# Patient Record
Sex: FEMALE | Race: ASIAN | NOT HISPANIC OR LATINO | Employment: UNEMPLOYED | URBAN - METROPOLITAN AREA
[De-identification: names, ages, dates, MRNs, and addresses within clinical notes are randomized per-mention and may not be internally consistent; named-entity substitution may affect disease eponyms.]

---

## 2023-11-21 ENCOUNTER — APPOINTMENT (OUTPATIENT)
Dept: OTOLARYNGOLOGY | Facility: CLINIC | Age: 18
End: 2023-11-21
Payer: COMMERCIAL

## 2023-12-12 ENCOUNTER — OFFICE VISIT (OUTPATIENT)
Dept: OTOLARYNGOLOGY | Facility: CLINIC | Age: 18
End: 2023-12-12
Payer: COMMERCIAL

## 2023-12-12 VITALS — HEIGHT: 61 IN | BODY MASS INDEX: 18.67 KG/M2 | WEIGHT: 98.9 LBS

## 2023-12-12 DIAGNOSIS — R04.0 EPISTAXIS: Primary | ICD-10-CM

## 2023-12-12 PROCEDURE — 31238 NSL/SINS NDSC SRG NSL HEMRRG: CPT | Performed by: OTOLARYNGOLOGY

## 2023-12-12 PROCEDURE — 99203 OFFICE O/P NEW LOW 30 MIN: CPT | Performed by: OTOLARYNGOLOGY

## 2023-12-12 PROCEDURE — 1036F TOBACCO NON-USER: CPT | Performed by: OTOLARYNGOLOGY

## 2023-12-12 RX ORDER — FLUOXETINE HYDROCHLORIDE 60 MG/1
60 TABLET, FILM COATED ORAL; ORAL DAILY
COMMUNITY
End: 2024-02-20 | Stop reason: SDUPTHER

## 2023-12-12 RX ORDER — BUPROPION HYDROCHLORIDE 300 MG/1
300 TABLET ORAL DAILY
COMMUNITY
End: 2024-02-20 | Stop reason: SDUPTHER

## 2023-12-12 ASSESSMENT — PATIENT HEALTH QUESTIONNAIRE - PHQ9
SUM OF ALL RESPONSES TO PHQ9 QUESTIONS 1 & 2: 0
2. FEELING DOWN, DEPRESSED OR HOPELESS: NOT AT ALL
1. LITTLE INTEREST OR PLEASURE IN DOING THINGS: NOT AT ALL

## 2023-12-12 ASSESSMENT — PAIN SCALES - GENERAL: PAINLEVEL: 0-NO PAIN

## 2023-12-14 NOTE — PROGRESS NOTES
"Chief Complaint:  Epistaxis    Referring Provider: No ref. provider found    History of Present Illness:    Kera Pelaez is a 18 y.o. female, presenting for evaluation of epistaxis. She has been having recurrent epistaxis episodes from both sides of the nose or many years. Lately she has been having more episodes and has actually started keeping track. There are no obvious inciting factors. She has never been treated for epistaxis in the past. The side of her bleeding varies. She has not used saline gel or another emollient. No history of easy bruising or heavy periods. No other history of sinonasal issues.     Review of Systems:    Review of symptoms was negative except for those stated including Cardiopulmonary, Genitourinary, Gastrointestinal, Psychological, Sleep pattern, Endocrine, Eyes, Neurologic, Musculoskeletal, Skin, Hematologic/Lymphatic and Allergic/Immunologic.     Medical History:    I have reviewed the patient's updated past medical history, surgical history, family history, social history, as well as current medications and allergies as of 12/12/2023. Changes to these items have been updated and marked as reviewed in the electronic medical record.    Physical Exam:    Vitals:  height is 1.549 m (5' 1\") and weight is 44.9 kg (98 lb 14.4 oz) (abnormal).   General: Patient doing well overall and is in no apparent distress.  Psych: Pleasant affect, and answers questions appropriately.  Head & Face: Symmetric facial movements  Eyes: Pupils equal, round, reactive.  Extraocular movements intact without gaze restrictions or nystagmus. No epiphora.  Ears:  External auditory canals are normal.  Tympanic membranes are clear.  No middle ear effusion is seen.  All middle ear landmarks are normal.  Nose: Anterior rhinoscopy revealed normal sinonasal mucosa. More posterior areas of the nasal cavity could not be completely examined.  Oral Cavity/Oropharynx:  Without lesions or masses to visual exam.  Neck: Supple " without lymphadenopathy.  Lungs: Non-labored, and without evidence of stridor.  Cardiac: Pulses are strong, well-perfused.  Extremities: Without gross evidence of clubbing, cyanosis, or edema.  Neuro: Cranial nerves II-XII grossly intact; Intact facial movements.    Procedure:  Endoscopic control of nasal hemorrhage  Pre-procedure diagnosis/Indication for procedure:  epistaxis  Anesthesia:  1% phenylephrine and 4% lidocaine topical spray   Description:  After application of anesthesia, a zero degree telescope was used to visualize the bilateral sinonasal cavity. There were numerous exposed superficial blood vessels along the anterior septum bilaterally. Silver nitrate was applied to the right sided vessels with good coagulation effect. No bleeding was appreciated and the patient tolerated the procedure well.     Assessment:     Kera Pelaez is a 18 y.o. female with epistaxis from bilateral anterior septal superficial blood vessels. Now s/p silver nitrate cautery of right sides vessels in the office today.    Plan:      Tolerated cautery well today. Advised to keep area moist and hydrated. Follow up in 2 weeks to re-assess. Counseled on use of Afrin in the event of bleeding. Will plan to cauterize remaining left sided vessels in January after the right side heals.      Ethan Lennon MD, M.Eng.   of Otolaryngology - Head & Neck Surgery  Division of Rhinology and Endoscopic Skull Base Surgery  [facility]

## 2023-12-26 ENCOUNTER — OFFICE VISIT (OUTPATIENT)
Dept: OTOLARYNGOLOGY | Facility: CLINIC | Age: 18
End: 2023-12-26
Payer: COMMERCIAL

## 2023-12-26 VITALS — WEIGHT: 99.2 LBS | HEIGHT: 61 IN | BODY MASS INDEX: 18.73 KG/M2

## 2023-12-26 DIAGNOSIS — R04.0 EPISTAXIS: Primary | ICD-10-CM

## 2023-12-26 PROCEDURE — 1036F TOBACCO NON-USER: CPT | Performed by: OTOLARYNGOLOGY

## 2023-12-26 PROCEDURE — 99213 OFFICE O/P EST LOW 20 MIN: CPT | Performed by: OTOLARYNGOLOGY

## 2023-12-26 ASSESSMENT — PAIN SCALES - GENERAL: PAINLEVEL: 0-NO PAIN

## 2023-12-26 NOTE — PROGRESS NOTES
"Chief Complaint:  Epistaxis follow up    Referring Provider: No ref. provider found    History of Present Illness:    Kera Pelaez is a 18 y.o. female, presenting for follow-up of epistaxis.  She states that since her cauterization on her last visit she has not had any significant bleeding.  She occasionally got some spotting on the right side at the source of cauterization but overall has not had any issues.  No new complaints.    ?  Review of Systems:    Review of symptoms was negative except for those stated including Cardiopulmonary, Genitourinary, Gastrointestinal, Psychological, Sleep pattern, Endocrine, Eyes, Neurologic, Musculoskeletal, Skin, Hematologic/Lymphatic and Allergic/Immunologic.     Medical History:    I have reviewed the patient's updated past medical history, surgical history, family history, social history, as well as current medications and allergies as of 12/26/2023. Changes to these items have been updated and marked as reviewed in the electronic medical record.    Physical Exam:    Vitals:  height is 1.549 m (5' 1\") and weight is 45 kg (99 lb 3.2 oz) (abnormal).   General: Patient doing well overall and is in no apparent distress.  Psych: Pleasant affect, and answers questions appropriately.  Head & Face: Symmetric facial movements  Eyes: Pupils equal, round, reactive.  Extraocular movements intact without gaze restrictions or nystagmus. No epiphora.  Ears:  External auditory canals are normal.  Tympanic membranes are clear.  No middle ear effusion is seen.  All middle ear landmarks are normal.  Nose: Anterior rhinoscopy revealed healing mucosa on right septum-- thin crusting observed and in place. Left septal vessels stable in appearance. More posterior areas of the nasal cavity could not be completely examined.  Oral Cavity/Oropharynx:  Without lesions or masses to visual exam.  Neck: Supple without lymphadenopathy.  Lungs: Non-labored, and without evidence of stridor.  Cardiac: Pulses are " strong, well-perfused.  Extremities: Without gross evidence of clubbing, cyanosis, or edema.  Neuro: Cranial nerves II-XII grossly intact; Intact facial movements.    Assessment:     Kera Pelaez is a 18 y.o. female with epistaxis from anterior septal vessels.    Plan:      I am quite happy with Kera's healing and think her right septum is coming along nicely.  I am uncertain if her left septum will be ready to cauterize in early January as we need the right sided mucosa to be fully healed to reduce the chances of a septal perforation from forming.  I advised her that we can either see her in January and evaluate the right septum and cauterized the left if it is ready or she can call and schedule an appointment when it is most convenient to her during the school year for us to take a look at both sides again.  She is significantly improved with respect to her bleeding and she would like to talk with her parents before deciding next steps.      Ethan Lennon MD, M.Eng.   of Otolaryngology - Head & Neck Surgery  Division of Rhinology and Endoscopic Skull Base Surgery  [facility]

## 2024-02-20 ENCOUNTER — TELEMEDICINE (OUTPATIENT)
Dept: BEHAVIORAL HEALTH | Facility: CLINIC | Age: 19
End: 2024-02-20
Payer: COMMERCIAL

## 2024-02-20 DIAGNOSIS — F32.1 MAJOR DEPRESSIVE DISORDER, SINGLE EPISODE, MODERATE (MULTI): ICD-10-CM

## 2024-02-20 DIAGNOSIS — F41.1 GAD (GENERALIZED ANXIETY DISORDER): ICD-10-CM

## 2024-02-20 PROBLEM — F51.8 DISRUPTED SLEEP-WAKE CYCLE: Status: ACTIVE | Noted: 2024-02-20

## 2024-02-20 PROBLEM — G47.20 DISRUPTED SLEEP-WAKE CYCLE: Status: ACTIVE | Noted: 2024-02-20

## 2024-02-20 PROBLEM — F32.A DEPRESSION: Status: ACTIVE | Noted: 2024-02-20

## 2024-02-20 PROCEDURE — 90833 PSYTX W PT W E/M 30 MIN: CPT | Performed by: STUDENT IN AN ORGANIZED HEALTH CARE EDUCATION/TRAINING PROGRAM

## 2024-02-20 PROCEDURE — 99214 OFFICE O/P EST MOD 30 MIN: CPT | Performed by: STUDENT IN AN ORGANIZED HEALTH CARE EDUCATION/TRAINING PROGRAM

## 2024-02-20 PROCEDURE — 1036F TOBACCO NON-USER: CPT | Performed by: STUDENT IN AN ORGANIZED HEALTH CARE EDUCATION/TRAINING PROGRAM

## 2024-02-20 RX ORDER — FLUOXETINE HYDROCHLORIDE 60 MG/1
60 TABLET, FILM COATED ORAL; ORAL DAILY
Qty: 90 TABLET | Refills: 3 | Status: SHIPPED | OUTPATIENT
Start: 2024-02-20

## 2024-02-20 RX ORDER — BUPROPION HYDROCHLORIDE 300 MG/1
300 TABLET ORAL DAILY
Qty: 90 TABLET | Refills: 3 | Status: SHIPPED | OUTPATIENT
Start: 2024-02-20

## 2024-02-20 NOTE — PROGRESS NOTES
"Outpatient Child and Adolescent Psychiatry    Subjective   Kera Pelaez, a 18 y.o. female, for follow up appointment. Patient was seen virtually.    Chief Complaint:  depression    HPI:   cell number is [phone]; [email or web address]    Kera notes she had low motivation, missing class. Last semester went ok although tough, passed classes, did have trouble with attendance. When completes work does well. Depressed and doesn't want to get up in the am. More anxious than usual. Lack of motivation started in Chinese class - gives her an identity crisis. When in class is participating and focused. Some suicidal ideation at times but no plan or intent. Sleep schedule not great. Not playing tennis anymore - big feelings about this. Taking a lot of credit hours, but did reduce hours a little at beginning of semester. 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 Taking a leadership class in the evenings once per week. President of [employer] adopted student program. Therapist went on maternity leave so no current therapist. Has intake next week with new therapist.    Sometimes doesn't takes meds and says \"sometimes I don't want to feel better\". Within past 7 days has missed two days. Meds do make her feel better when takes consistently.      Last visit:  on sabatical but has [employer] advisor for academic planning.      Sister is Angeline (15). GF is Rossana.     Mental Status Exam:   General appearance: Unremarkable  Engagement: Well related  Psychomotor activity: Within normal limits  Speech and Language: Normal  Mood: Euthymic  Affect: Appropriate  Though process: Linear  Perceptual disturbances: None  Attention: Normal  Judgement and insight: commensurate with development  Suicidality and homicidality: No current suicidal or homicidal ideations, plan or " "intent    Current Medications:    Current Outpatient Medications:     buPROPion XL (Wellbutrin XL) 300 mg 24 hr tablet, Take 1 tablet (300 mg) by mouth once daily. as directed, Disp: , Rfl:     FLUoxetine (PROzac) 60 mg tablet, Take 1 tablet (60 mg) by mouth once daily., Disp: , Rfl:     Assessment/Plan     Diagnosis:   1. Major depressive disorder, single episode, moderate (CMS/HCC)        2. CHIDI (generalized anxiety disorder)            Kera is an 18 year old F hx of symptoms of depression since March 2020 (although better during summer 2020) including daily low mood, loss of interest/anhedonia, lack of motivation/declining grades, disrupted sleep, fatigue, guilt, low appetite, hx of SI. Struggles with some anxiety at times. Hx IOP at [facility] Dec 2021.     At this visit some low mood, anxiety, lack of motivation at school. No acute safety concerns.    16+ minutes were spent doing supportive psychotherapy and psychoeducation  Dx: anxiety  Target symptoms: anxiety  Intervention: supportive psychotherapy  Description: Brainstorming coping skills/scheduling  Response: Patient appreciated feedback     Dx: MDD, moderate, first episode; disrupted sleep-wake cycle     Past psych meds: None     Plan:  - Intake with new therapist at [place] next week  - Continue fluoxetine (prozac) 60mg daily for depression. Next refill due Feb 2024  - Continue Wellbutrin XL 300mg for depression. Next refill due  Feb 2024  - Recommended \"Wherever you go There you Are\" and learning about mindfullness at previous visit  - f/u March 19 at 10:30am (virtual)      Martita Bailey MD  "

## 2024-04-17 ENCOUNTER — TELEMEDICINE (OUTPATIENT)
Dept: BEHAVIORAL HEALTH | Facility: CLINIC | Age: 19
End: 2024-04-17
Payer: COMMERCIAL

## 2024-04-17 DIAGNOSIS — F32.1 MAJOR DEPRESSIVE DISORDER, SINGLE EPISODE, MODERATE (MULTI): ICD-10-CM

## 2024-04-17 DIAGNOSIS — F41.1 GAD (GENERALIZED ANXIETY DISORDER): ICD-10-CM

## 2024-04-17 PROCEDURE — 99213 OFFICE O/P EST LOW 20 MIN: CPT | Performed by: STUDENT IN AN ORGANIZED HEALTH CARE EDUCATION/TRAINING PROGRAM

## 2024-04-17 PROCEDURE — 1036F TOBACCO NON-USER: CPT | Performed by: STUDENT IN AN ORGANIZED HEALTH CARE EDUCATION/TRAINING PROGRAM

## 2024-04-17 NOTE — PROGRESS NOTES
"Outpatient Child and Adolescent Psychiatry    Subjective   Kera Pelaez, a 18 y.o. female, for follow up appointment. Patient was seen virtually.    Last seen 3/19/2024. No med changes.  Interim changes: None  Chart review: No new documentation  Last vitals: 7/20/2022 45kg  Last labs: 3/2021 Vitamin D 21    Chief Complaint:  depression    HPI:   cell number is [phone]; [email or web address]    Kera notes she missed last appointment. Has two weeks left of class. Has been stressed, trying to take meds consistently (week before and week before that wasn't taking meds). Has talked to therapist about adherence. Could consider giving a timeline for taking them consistently. Talked with therapist about mom being resistant to meds in the beginning or when increased dose in the past. Depression has been \"moderate\" due to lots of stress. Frustration with group projects. Time management has been ok. Catching up on missing work still. Hard to focus on priorities. 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 Excited about this. Worried about having to get up early and get to work early. Denies any suicidal ideation. Likes new therapist. Lots of race and identity struggles lately.     Previous visit: not playing tennis anymore  Younger sister is Angeline. GF is Rossana.     Mental Status Exam:   General appearance: Unremarkable  Engagement: Well related  Psychomotor activity: Within normal limits  Speech and Language: Normal  Mood: Euthymic  Affect: Appropriate  Though process: Linear  Perceptual disturbances: None  Attention: Normal  Judgement and insight: commensurate with development  Suicidality and homicidality: No current suicidal or homicidal ideations, plan or intent    Current Medications:    Current Outpatient Medications:     buPROPion XL (Wellbutrin XL) 300 mg 24 hr tablet, Take 1 tablet (300 mg) by mouth once daily. as directed, Disp: 90 tablet, Rfl: " "3    FLUoxetine (PROzac) 60 mg tablet, Take 1 tablet (60 mg) by mouth once daily., Disp: 90 tablet, Rfl: 3    Assessment/Plan     Diagnosis:   1. Major depressive disorder, single episode, moderate (Multi)        2. CHIDI (generalized anxiety disorder)            Kera is an 18 year old F hx of symptoms of depression since March 2020 (although better during summer 2020) including daily low mood, loss of interest/anhedonia, lack of motivation/declining grades, disrupted sleep, fatigue, guilt, low appetite, hx of SI. Struggles with some anxiety at times. Hx IOP at [facility] Dec 2021.     At this visit managing depression and anxiety. No acute safety concerns.     Dx: MDD, moderate, first episode; disrupted sleep-wake cycle     Past psych meds: None     Plan:  - Continue therapy at [place]  - Continue fluoxetine (prozac) 60mg daily for depression. Next refill due Feb 2025  - Continue Wellbutrin XL 300mg for depression. Next refill due Feb 2025  - Recommended \"Wherever you go There you Are\" and learning about mindfullness at previous visit  - f/u May 29 at 11:30am (virtual)      Martita Bailey MD  "

## 2024-05-29 ENCOUNTER — TELEMEDICINE (OUTPATIENT)
Dept: BEHAVIORAL HEALTH | Facility: CLINIC | Age: 19
End: 2024-05-29
Payer: COMMERCIAL

## 2024-05-29 DIAGNOSIS — F32.1 MAJOR DEPRESSIVE DISORDER, SINGLE EPISODE, MODERATE (MULTI): ICD-10-CM

## 2024-05-29 DIAGNOSIS — F41.1 GAD (GENERALIZED ANXIETY DISORDER): ICD-10-CM

## 2024-05-29 PROCEDURE — 1036F TOBACCO NON-USER: CPT | Performed by: STUDENT IN AN ORGANIZED HEALTH CARE EDUCATION/TRAINING PROGRAM

## 2024-05-29 PROCEDURE — 99212 OFFICE O/P EST SF 10 MIN: CPT | Performed by: STUDENT IN AN ORGANIZED HEALTH CARE EDUCATION/TRAINING PROGRAM

## 2024-05-29 NOTE — PROGRESS NOTES
Outpatient Child and Adolescent Psychiatry    Subjective   Kera Pelaez, a 18 y.o. female, for follow up appointment. Patient was seen virtually.    Last seen 4/17/2024. No med changes.  Interim changes: None  Chart review: No new documentation  Last vitals: 12/26/2023  Last labs: 3/2021 Vitamin D 21    Chief Complaint:  depression    HPI:   cell number is [phone]; [email or web address]    Kera home for summer. She feels she is doing ok. Claustrophobic at home at times. 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 Excited. Mood has been ok because less pressure. Has been more adherent with meds. Reading, catching up with friends. Spending time with Rossana. She notes she has been in therapy for 3.5 years and feels like underyling issues are there - stuck in same habits with school, identity issues. Most therapy has been CBT/DBT but wonders about EMDR - another adoptive friend found it helpful. She found some therapists around who will do EMDR. She will follow up on this.    Previous visit: not playing tennis anymore at college  Younger sister is Angeline. GF is Rossana.     Mental Status Exam:   General appearance: Unremarkable  Engagement: Well related  Psychomotor activity: Within normal limits  Speech and Language: Normal  Mood: Euthymic  Affect: Appropriate  Though process: Linear  Perceptual disturbances: None  Attention: Normal  Judgement and insight: commensurate with development  Suicidality and homicidality: No current suicidal or homicidal ideations, plan or intent    Current Medications:    Current Outpatient Medications:     buPROPion XL (Wellbutrin XL) 300 mg 24 hr tablet, Take 1 tablet (300 mg) by mouth once daily. as directed, Disp: 90 tablet, Rfl: 3    FLUoxetine (PROzac) 60 mg tablet, Take 1 tablet (60 mg) by mouth once daily., Disp: 90 tablet, Rfl: 3    Assessment/Plan     Diagnosis:   1. CHIDI (generalized anxiety disorder)        2. Major depressive  "disorder, single episode, moderate (Multi)            Kera is an 18 year old F hx of symptoms of depression since March 2020 (although better during summer 2020) including daily low mood, loss of interest/anhedonia, lack of motivation/declining grades, disrupted sleep, fatigue, guilt, low appetite, hx of SI. Struggles with some anxiety at times. Hx IOP at [facility] Dec 2021.     At this visit managing depression and anxiety. No acute safety concerns.     Dx: MDD, moderate, first episode; disrupted sleep-wake cycle     Past psych meds: None     Plan:  - Continue therapy at [facility]; is going to try EMDR  - Continue fluoxetine (prozac) 60mg daily for depression. Next refill due Feb 2025  - Continue Wellbutrin XL 300mg for depression. Next refill due Feb 2025  - Recommended \"Wherever you go There you Are\" and learning about mindfullness at previous visit  - f/u August 6 at 11:30am (virtual)      Martita Bailey MD  "

## 2024-07-10 DIAGNOSIS — F41.1 GAD (GENERALIZED ANXIETY DISORDER): ICD-10-CM

## 2024-07-10 DIAGNOSIS — F32.1 MAJOR DEPRESSIVE DISORDER, SINGLE EPISODE, MODERATE (MULTI): ICD-10-CM

## 2024-07-11 RX ORDER — FLUOXETINE HYDROCHLORIDE 60 MG/1
60 TABLET, FILM COATED ORAL; ORAL DAILY
Qty: 90 TABLET | Refills: 3 | Status: SHIPPED | OUTPATIENT
Start: 2024-07-11

## 2024-08-06 ENCOUNTER — APPOINTMENT (OUTPATIENT)
Dept: OPHTHALMOLOGY | Facility: CLINIC | Age: 19
End: 2024-08-06
Payer: COMMERCIAL

## 2024-08-06 ENCOUNTER — APPOINTMENT (OUTPATIENT)
Dept: BEHAVIORAL HEALTH | Facility: CLINIC | Age: 19
End: 2024-08-06
Payer: COMMERCIAL

## 2024-08-06 DIAGNOSIS — F41.1 GAD (GENERALIZED ANXIETY DISORDER): ICD-10-CM

## 2024-08-06 DIAGNOSIS — F32.1 MAJOR DEPRESSIVE DISORDER, SINGLE EPISODE, MODERATE (MULTI): ICD-10-CM

## 2024-08-06 PROCEDURE — 99214 OFFICE O/P EST MOD 30 MIN: CPT | Performed by: STUDENT IN AN ORGANIZED HEALTH CARE EDUCATION/TRAINING PROGRAM

## 2024-08-06 PROCEDURE — 1036F TOBACCO NON-USER: CPT | Performed by: STUDENT IN AN ORGANIZED HEALTH CARE EDUCATION/TRAINING PROGRAM

## 2024-08-06 NOTE — PROGRESS NOTES
Outpatient Child and Adolescent Psychiatry    Subjective   Kera Pelaez, a 18 y.o. female, for follow up appointment. Patient was seen virtually.    Last seen 5/29/2024 No med changes.  Interim changes: None  Chart review: No new documentation  Last vitals: 12/26/2023  Last labs: 3/2021 Vitamin D 21    Chief Complaint:  depression    HPI:   cell number is [phone]; [email or web address]    Busy summer. 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 Tiring. Lesson planning, long commute. Some trouble waking up in am. Stopped taking meds for a few weeks. Starts as being forgetful. [place] stable. Things were worse because not taking meds. Talked about her impressions of medications and tying into her thoughts even when young. Still hasn't found a therapist for EMDR/LGBTQ/adoption/female.     Was considering taking a semester off but decided to go back this fall. Being home also challenging.    Previous visit: not playing tennis anymore at college  Younger sister is Angeline. GF is Rossana.     Mental Status Exam:   General appearance: Unremarkable  Engagement: Well related  Psychomotor activity: Within normal limits  Speech and Language: Normal  Mood: Euthymic  Affect: Appropriate  Though process: Linear  Perceptual disturbances: None  Attention: Normal  Judgement and insight: commensurate with development  Suicidality and homicidality: No current suicidal or homicidal ideations, plan or intent    Current Medications:    Current Outpatient Medications:     buPROPion XL (Wellbutrin XL) 300 mg 24 hr tablet, Take 1 tablet (300 mg) by mouth once daily. as directed, Disp: 90 tablet, Rfl: 3    FLUoxetine (PROzac) 60 mg tablet, Take 1 tablet (60 mg) by mouth once daily., Disp: 90 tablet, Rfl: 3    Assessment/Plan     Diagnosis:   1. CHIDI (generalized anxiety disorder)        2. Major depressive disorder, single episode, moderate (Multi)            Kera is an 18 year old F hx of symptoms of depression since March 2020 (although  "better during summer 2020) including daily low mood, loss of interest/anhedonia, lack of motivation/declining grades, disrupted sleep, fatigue, guilt, low appetite, hx of SI. Struggles with some anxiety at times. Hx IOP at [facility] Dec 2021.     At this visit managing depression and anxiety. No acute safety concerns.     Dx: MDD, moderate, first episode; disrupted sleep-wake cycle     Past psych meds: None     Plan:  - Looking for new therapist. Hx therapy at [place]. Is going to try EMDR  - Continue fluoxetine (prozac) 60mg daily for depression. Next refill due Feb 2025  - Continue Wellbutrin XL 300mg for depression. Next refill due Feb 2025  - Recommended \"Wherever you go There you Are\" and learning about mindfullness at previous visit  - follow up September 9 at 11:30am (virtual)      Martita Bailey MD  "

## 2024-08-16 ENCOUNTER — APPOINTMENT (OUTPATIENT)
Dept: OPHTHALMOLOGY | Facility: CLINIC | Age: 19
End: 2024-08-16
Payer: COMMERCIAL

## 2024-08-16 DIAGNOSIS — H52.222 REGULAR ASTIGMATISM OF LEFT EYE: ICD-10-CM

## 2024-08-16 DIAGNOSIS — H52.13 MYOPIA, BILATERAL: Primary | ICD-10-CM

## 2024-08-16 PROCEDURE — 92015 DETERMINE REFRACTIVE STATE: CPT | Performed by: OPTOMETRIST

## 2024-08-16 PROCEDURE — FLVLF CONTACT LENS EVALUATION (SP): Performed by: OPTOMETRIST

## 2024-08-16 PROCEDURE — 92014 COMPRE OPH EXAM EST PT 1/>: CPT | Performed by: OPTOMETRIST

## 2024-08-16 ASSESSMENT — SLIT LAMP EXAM - LIDS: COMMENTS: NORMAL

## 2024-08-16 ASSESSMENT — VISUAL ACUITY
OD_CC: 20/20
CORRECTION_TYPE: GLASSES
OS_CC: 20/25
METHOD: SNELLEN - LINEAR
OS_CC+: +1
OD_CC+: -1

## 2024-08-16 ASSESSMENT — REFRACTION_CURRENTRX
OD_SPHERE: -6.00
OS_BASECURVE: 8.6
OD_BRAND: BIOTRUE ONE DAY
OS_BRAND: BIOTRUE ONE DAY
OD_SPHERE: -5.75
OD_BASECURVE: 8.6
OS_BASECURVE: 8.6
OD_BASECURVE: 8.6
OS_SPHERE: -5.00
OS_SPHERE: -5.00
OS_DIAMETER: 14.2
OS_DIAMETER: 14.2
OD_DIAMETER: 14.2
OS_BRAND: BIOTRUE ONE DAY
OD_BRAND: BIOTRUE ONE DAY
OD_DIAMETER: 14.2

## 2024-08-16 ASSESSMENT — EXTERNAL EXAM - RIGHT EYE: OD_EXAM: NORMAL

## 2024-08-16 ASSESSMENT — EXTERNAL EXAM - LEFT EYE: OS_EXAM: NORMAL

## 2024-08-16 ASSESSMENT — ENCOUNTER SYMPTOMS
MUSCULOSKELETAL NEGATIVE: 0
CARDIOVASCULAR NEGATIVE: 0
CONSTITUTIONAL NEGATIVE: 0
HEMATOLOGIC/LYMPHATIC NEGATIVE: 0
RESPIRATORY NEGATIVE: 0
PSYCHIATRIC NEGATIVE: 0
GASTROINTESTINAL NEGATIVE: 0
ALLERGIC/IMMUNOLOGIC NEGATIVE: 0
EYES NEGATIVE: 1
NEUROLOGICAL NEGATIVE: 0
ENDOCRINE NEGATIVE: 0

## 2024-08-16 ASSESSMENT — CONF VISUAL FIELD
OS_INFERIOR_NASAL_RESTRICTION: 0
OD_INFERIOR_NASAL_RESTRICTION: 0
OD_INFERIOR_TEMPORAL_RESTRICTION: 0
METHOD: COUNTING FINGERS
OS_INFERIOR_TEMPORAL_RESTRICTION: 0
OS_SUPERIOR_NASAL_RESTRICTION: 0
OS_NORMAL: 1
OD_NORMAL: 1
OD_SUPERIOR_NASAL_RESTRICTION: 0
OD_SUPERIOR_TEMPORAL_RESTRICTION: 0
OS_SUPERIOR_TEMPORAL_RESTRICTION: 0

## 2024-08-16 ASSESSMENT — REFRACTION_MANIFEST
OS_CYLINDER: SPHER
OS_SPHERE: -6.00
OD_SPHERE: -6.50
OD_CYLINDER: SPHER

## 2024-08-16 ASSESSMENT — REFRACTION_WEARINGRX
OS_CYLINDER: +0.50
OD_SPHERE: -6.00
OD_CYLINDER: SPHER
SPECS_TYPE: DVO
OS_AXIS: 068
OS_SPHERE: -5.75

## 2024-08-16 ASSESSMENT — REFRACTION
OD_SPHERE: -6.25
OD_SPHERE: -6.50
OS_SPHERE: -5.50
OS_CYLINDER: -0.25
OS_CYLINDER: +0.25
OS_AXIS: 160
OS_SPHERE: -4.75
OS_AXIS: 070

## 2024-08-16 ASSESSMENT — TONOMETRY
OD_IOP_MMHG: 17
IOP_METHOD: GOLDMANN APPLANATION
OS_IOP_MMHG: 17

## 2024-08-16 NOTE — PROGRESS NOTES
Assessment/Plan   Diagnoses and all orders for this visit:  Myopia, bilateral  Regular astigmatism of left eye  - patient educated on today's findings  New spec rx released today per patient request. Ocular health wnl for age OU. Monitor 1 year or sooner prn. Refraction billed today.  Discussed proper wear, care, replacement of contact lenses. Gave handout. D/c cl wear and RTC if eyes become red, painful, irritated. Monitor 1 year.   CL eval billed today. $35      RTC 1 year for comprehensive exam

## 2024-09-09 ENCOUNTER — APPOINTMENT (OUTPATIENT)
Dept: BEHAVIORAL HEALTH | Facility: CLINIC | Age: 19
End: 2024-09-09
Payer: COMMERCIAL

## 2024-09-09 DIAGNOSIS — F32.1 MAJOR DEPRESSIVE DISORDER, SINGLE EPISODE, MODERATE (MULTI): ICD-10-CM

## 2024-09-09 DIAGNOSIS — F41.1 GAD (GENERALIZED ANXIETY DISORDER): ICD-10-CM

## 2024-09-09 PROBLEM — F32.A DEPRESSION: Status: RESOLVED | Noted: 2024-02-20 | Resolved: 2024-09-09

## 2024-09-09 PROCEDURE — 99214 OFFICE O/P EST MOD 30 MIN: CPT | Performed by: STUDENT IN AN ORGANIZED HEALTH CARE EDUCATION/TRAINING PROGRAM

## 2024-09-09 NOTE — PROGRESS NOTES
Outpatient Child and Adolescent Psychiatry    Kera Pelaez, a 18 y.o. female, is seen for psychiatric medication management follow up. An interactive audio and video telecommunication system which permits real time communications between the patient (at the originating site) and provider (at the distant site) was utilized to provide this telehealth service.  Verbal consent was requested and obtained from Kera Pelaez on this date, 09/09/24 for a telehealth visit.      Last seen 8/6/2024 No med changes.  Interim changes: None  Chart review: Reviewed  Last vitals: 12/26/2023  Last labs: 3/2021 Vitamin D 21    Chief Complaint:  depression    Subjective   Notes some trouble adjusting to school this year. Some low mood. Feeling overwhelmed at times and thouths of giving up but can counter those thoughts. Misses meds sometimes. Could bring some with her. Has missed some classes already especially 8am education class. Set up weekly meeting with prof/advisor. Hard to get to bed early enough. Napping. [place] . Excited. 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 Hoping to work on identity stuff (in person).    Younger sister is Angeline. GF is Rossana.     Mental Status Exam:   General appearance: Unremarkable  Engagement: Well related  Psychomotor activity: Within normal limits  Speech and Language: Normal  Mood: Euthymic  Affect: Appropriate  Though process: Linear  Perceptual disturbances: None  Attention: Normal  Judgement and insight: commensurate with development  Suicidality and homicidality: No current suicidal or homicidal ideations, plan or intent    Current Medications:    Current Outpatient Medications:     buPROPion XL (Wellbutrin XL) 300 mg 24 hr tablet, Take 1 tablet (300 mg) by mouth once daily. as directed, Disp: 90 tablet, Rfl: 3    FLUoxetine (PROzac) 60 mg tablet, Take 1 tablet (60 mg)  "by mouth once daily., Disp: 90 tablet, Rfl: 3      Assessment/Plan   09/09/24: Patient is a 18 y.o. female who is seen for follow up.    Diagnosis:   1. CHIDI (generalized anxiety disorder)        2. Major depressive disorder, single episode, moderate (Multi)            Hx of symptoms of depression since March 2020 (although better during summer 2020) including daily low mood, loss of interest/anhedonia, lack of motivation/declining grades, disrupted sleep, fatigue, guilt, low appetite, hx of SI. Struggles with some anxiety at times. Hx IOP at [facility] Dec 2021.     At this visit managing depression and anxiety. No acute safety concerns.     Past psych meds: None     Plan:  - Looking for new therapist. Hx therapy at [place]. Is going to try EMDR  - Continue fluoxetine (prozac) 60mg daily for depression. Next refill due Feb 2025  - Continue Wellbutrin XL 300mg for depression. Next refill due Feb 2025  - Recommended \"Wherever you go There you Are\" and learning about mindfullness at previous visit  - follow up October 7 at 11:30am (virtual)      Martita Bailey MD   "

## 2024-09-21 ENCOUNTER — APPOINTMENT (OUTPATIENT)
Dept: OPHTHALMOLOGY | Facility: CLINIC | Age: 19
End: 2024-09-21
Payer: COMMERCIAL

## 2024-10-07 ENCOUNTER — APPOINTMENT (OUTPATIENT)
Dept: BEHAVIORAL HEALTH | Facility: CLINIC | Age: 19
End: 2024-10-07
Payer: COMMERCIAL

## 2024-10-08 ENCOUNTER — APPOINTMENT (OUTPATIENT)
Dept: OTOLARYNGOLOGY | Facility: CLINIC | Age: 19
End: 2024-10-08
Payer: COMMERCIAL

## 2024-10-08 VITALS — BODY MASS INDEX: 18.69 KG/M2 | HEIGHT: 61 IN | WEIGHT: 99 LBS

## 2024-10-08 DIAGNOSIS — R04.0 EPISTAXIS: Primary | ICD-10-CM

## 2024-10-08 PROCEDURE — 99213 OFFICE O/P EST LOW 20 MIN: CPT | Performed by: OTOLARYNGOLOGY

## 2024-10-08 PROCEDURE — 31238 NSL/SINS NDSC SRG NSL HEMRRG: CPT | Performed by: OTOLARYNGOLOGY

## 2024-10-08 PROCEDURE — 1036F TOBACCO NON-USER: CPT | Performed by: OTOLARYNGOLOGY

## 2024-10-08 PROCEDURE — 3008F BODY MASS INDEX DOCD: CPT | Performed by: OTOLARYNGOLOGY

## 2024-10-08 ASSESSMENT — PAIN SCALES - GENERAL: PAINLEVEL: 0-NO PAIN

## 2024-10-08 NOTE — PROGRESS NOTES
"Chief Complaint:  Epistaxis    Referring Provider: No ref. provider found    History of Present Illness:    Kera Pelaez is a 18 y.o. female, presenting for evaluation of epistaxis.  Since her last visit where we cauterized the right side of her nose she has not had any difficulty with epistaxis from the right side.  She is continuing to have left-sided epistaxis however.  She most recently bled in the last 24 hours.  She is still been using oxymetazoline and Vaseline as nasal emollients.  No other recent changes to her sinonasal history.    ?  Review of Systems:    Review of symptoms was negative except for those stated including Cardiopulmonary, Genitourinary, Gastrointestinal, Psychological, Sleep pattern, Endocrine, Eyes, Neurologic, Musculoskeletal, Skin, Hematologic/Lymphatic and Allergic/Immunologic.     Medical History:    I have reviewed the patient's updated past medical history, surgical history, family history, social history, as well as current medications and allergies as of 10/8/2024. Changes to these items have been updated and marked as reviewed in the electronic medical record.    Physical Exam:    Vitals:  height is 1.537 m (5' 0.5\") and weight is 44.9 kg (99 lb) (abnormal).   General: Patient doing well overall and is in no apparent distress.  Psych: Pleasant affect, and answers questions appropriately.  Head & Face: Symmetric facial movements  Eyes: Pupils equal, round, reactive.  Extraocular movements intact without gaze restrictions or nystagmus. No epiphora.  Ears:  External auditory canals are normal.  Tympanic membranes are clear.  No middle ear effusion is seen.  All middle ear landmarks are normal.  Nose: Anterior rhinoscopy revealed normal sinonasal mucosa. More posterior areas of the nasal cavity could not be completely examined.  Oral Cavity/Oropharynx:  Without lesions or masses to visual exam.  Neck: Supple without lymphadenopathy.  Lungs: Non-labored, and without evidence of " stridor.  Cardiac: Pulses are strong, well-perfused.  Extremities: Without gross evidence of clubbing, cyanosis, or edema.  Neuro: Cranial nerves II-XII grossly intact; Intact facial movements.    Procedure Note:  Procedure: Nasal endoscopy - with control of nasal hemorrhage  Indication: Epistaxis  Attestation: Given patient's history and physical examination, the above procedure was recommended. We reviewed the risks, benefits, alternatives, and what to expect from the procedure and informed consent was obtained from the patient. The patient wished to proceed. I was present for the entirety of the procedure.     Findings: After topical decongestion with oxymetazoline spray, rigid endoscopy was performed using a 0 degree endoscope. The septum was midline.  The right side had healed well.  On the left side there were several superficial blood vessels.  I applied silver nitrate to the area which resulted in successful chemical cautery of the exposed blood vessels.  The inferior and middle turbinates appeared healthy, the middle meatus is free of purulence, masses, lesions or polyps. The nasal cavity is patent. The nasopharynx was clear. There were no complications and the patient tolerated the procedure well.     Assessment:     Kera Pelaez is a 18 y.o. female with epistaxis, controlled with silver nitrate on the left side today.    Plan:      Kera did well after her last in office cauterization and desired in office cauterization of the left nasal septum today.  This was performed.  She will call follow-up with us as needed.  She will use saline gel to the affected area twice daily.      Ethan Lennon MD, M.Eng.   of Otolaryngology - Head & Neck Surgery  Division of Rhinology and Endoscopic Skull Base Surgery  [facility]

## 2024-10-30 ENCOUNTER — HOSPITAL ENCOUNTER (EMERGENCY)
Age: 19
Discharge: PSYCHIATRIC OR SUBSTANCE USE FACILITY | End: 2024-10-30
Payer: COMMERCIAL

## 2024-10-30 ENCOUNTER — APPOINTMENT (OUTPATIENT)
Dept: BEHAVIORAL HEALTH | Facility: CLINIC | Age: 19
End: 2024-10-30
Payer: COMMERCIAL

## 2024-10-30 VITALS
HEART RATE: 99 BPM | SYSTOLIC BLOOD PRESSURE: 117 MMHG | DIASTOLIC BLOOD PRESSURE: 76 MMHG | OXYGEN SATURATION: 98 % | TEMPERATURE: 98.1 F | RESPIRATION RATE: 16 BRPM

## 2024-10-30 VITALS
OXYGEN SATURATION: 99 % | HEART RATE: 95 BPM | SYSTOLIC BLOOD PRESSURE: 110 MMHG | RESPIRATION RATE: 16 BRPM | DIASTOLIC BLOOD PRESSURE: 71 MMHG

## 2024-10-30 VITALS
DIASTOLIC BLOOD PRESSURE: 71 MMHG | OXYGEN SATURATION: 98 % | SYSTOLIC BLOOD PRESSURE: 129 MMHG | HEART RATE: 88 BPM | RESPIRATION RATE: 16 BRPM

## 2024-10-30 VITALS
SYSTOLIC BLOOD PRESSURE: 120 MMHG | OXYGEN SATURATION: 97 % | DIASTOLIC BLOOD PRESSURE: 82 MMHG | TEMPERATURE: 97.88 F | HEART RATE: 83 BPM | RESPIRATION RATE: 16 BRPM

## 2024-10-30 VITALS — TEMPERATURE: 97.88 F | SYSTOLIC BLOOD PRESSURE: 94 MMHG | DIASTOLIC BLOOD PRESSURE: 68 MMHG

## 2024-10-30 VITALS
RESPIRATION RATE: 16 BRPM | DIASTOLIC BLOOD PRESSURE: 82 MMHG | HEART RATE: 93 BPM | SYSTOLIC BLOOD PRESSURE: 120 MMHG | OXYGEN SATURATION: 97 %

## 2024-10-30 VITALS — BODY MASS INDEX: 18.5 KG/M2

## 2024-10-30 VITALS
RESPIRATION RATE: 16 BRPM | DIASTOLIC BLOOD PRESSURE: 67 MMHG | HEART RATE: 91 BPM | OXYGEN SATURATION: 99 % | SYSTOLIC BLOOD PRESSURE: 115 MMHG

## 2024-10-30 VITALS
RESPIRATION RATE: 16 BRPM | OXYGEN SATURATION: 99 % | SYSTOLIC BLOOD PRESSURE: 122 MMHG | DIASTOLIC BLOOD PRESSURE: 71 MMHG | HEART RATE: 93 BPM

## 2024-10-30 DIAGNOSIS — F41.9: ICD-10-CM

## 2024-10-30 DIAGNOSIS — F41.1 GAD (GENERALIZED ANXIETY DISORDER): ICD-10-CM

## 2024-10-30 DIAGNOSIS — F32.1 MAJOR DEPRESSIVE DISORDER, SINGLE EPISODE, MODERATE (MULTI): ICD-10-CM

## 2024-10-30 DIAGNOSIS — Z79.899: ICD-10-CM

## 2024-10-30 DIAGNOSIS — F32.A: Primary | ICD-10-CM

## 2024-10-30 DIAGNOSIS — R45.851: ICD-10-CM

## 2024-10-30 LAB
ALCOHOL, BLOOD (MEDICAL)-SERUM: < 3 MG/DL
AMPHET UR-MCNC: NEGATIVE NG/ML
ANION GAP: 3 (ref 5–15)
B-HCG SERPL QL: NEGATIVE NEGATIVE
BARBITURATE URINE VISTA: NEGATIVE
BENZODIAZEPINE URINE VISTA: NEGATIVE
BUN SERPL-MCNC: 12 MG/DL (ref 7–18)
BUN/CREAT RATIO: 18.6 RATIO (ref 10–20)
CALCIUM SERPL-MCNC: 9.1 MG/DL (ref 8.5–10.1)
CARBON DIOXIDE: 28 MMOL/L (ref 21–32)
CHLORIDE: 108 MMOL/L (ref 98–107)
COCAINE URINE VISTA: NEGATIVE
DEPRECATED RDW RBC: 40.1 FL (ref 35.1–43.9)
DRUG CONFIRMATION TO FOLLOW?: (no result)
ECSTACY URINE VISTA: POSITIVE
ERYTHROCYTE [DISTWIDTH] IN BLOOD: 12 % (ref 11.6–14.6)
EST GLOM FILT RATE - AFR AMER: 150 ML/MIN (ref 60–?)
ESTIMATED CREATININE CLEARANCE: 97.58 ML/MIN
GLUCOSE: 109 MG/DL (ref 74–106)
HCG SERPL QL: NEGATIVE NEGATIVE
HCT VFR BLD AUTO: 38.4 % (ref 37–47)
HEMOGLOBIN: 12.9 G/DL (ref 12–15)
HGB BLD-MCNC: 12.9 G/DL (ref 12–15)
IMMATURE GRANULOCYTES COUNT: 0.02 X10^3/UL (ref 0–0)
INTERNAL QC VALIDATED?: (no result)
MCV RBC: 91 FL (ref 81–99)
MEAN CORP HGB CONC: 33.6 G/DL (ref 32–36)
MEAN PLATELET VOL.: 11.1 FL (ref 6.2–12)
METHADONE URINE VISTA: NEGATIVE
NRBC FLAGGED BY ANALYZER: 0 % (ref 0–5)
PCP UR QL: NEGATIVE
PH UR: 6 [PH]
PLATELET # BLD: 233 K/MM3 (ref 150–450)
PLATELET COUNT: 233 K/MM3 (ref 150–450)
POTASSIUM: 3.8 MMOL/L (ref 3.5–5.1)
RBC # BLD AUTO: 4.22 M/MM3 (ref 4.2–5.4)
RBC DISTRIBUTION WIDTH CV: 12 % (ref 11.6–14.6)
RBC DISTRIBUTION WIDTH SD: 40.1 FL (ref 35.1–43.9)
THC URINE VISTA: NEGATIVE
WBC # BLD AUTO: 6.7 K/MM3 (ref 4.4–11)
WHITE BLOOD COUNT: 6.7 K/MM3 (ref 4.4–11)

## 2024-10-30 PROCEDURE — 80307 DRUG TEST PRSMV CHEM ANLYZR: CPT

## 2024-10-30 PROCEDURE — 85025 COMPLETE CBC W/AUTO DIFF WBC: CPT

## 2024-10-30 PROCEDURE — 99215 OFFICE O/P EST HI 40 MIN: CPT | Performed by: STUDENT IN AN ORGANIZED HEALTH CARE EDUCATION/TRAINING PROGRAM

## 2024-10-30 PROCEDURE — 99284 EMERGENCY DEPT VISIT MOD MDM: CPT

## 2024-10-30 PROCEDURE — 84703 CHORIONIC GONADOTROPIN ASSAY: CPT

## 2024-10-30 PROCEDURE — 82077 ASSAY SPEC XCP UR&BREATH IA: CPT

## 2024-10-30 PROCEDURE — 1036F TOBACCO NON-USER: CPT | Performed by: STUDENT IN AN ORGANIZED HEALTH CARE EDUCATION/TRAINING PROGRAM

## 2024-10-30 PROCEDURE — 80048 BASIC METABOLIC PNL TOTAL CA: CPT

## 2024-10-30 NOTE — EX.ED.VIS.PS
HPI
HPI - Psych
History of Present Illness
Chief Complaint: Suicidal
Informant: patient
Onset/Context/Timing
Onset: Days
Context: Gradual Onset
Timing: Continuous
Worsened by: - (Increased stress at school)
Relieved by: Nothing
Associated Symptoms
Associated Symptoms - Psych: Positive for Depressed, Hopelessness and Suicidal Thoughts; Negative for Change in Eating, Change in sleeping, Paranoia, Visual Hallucinations or Auditory Hallucinations
Specific plan (suicidal thought): Jumping off high places, going to railroad tracks
Narrative
Narrative: 
Patient presents with depression and suicidal ideation that has been getting worse over the past couple days.  Patient states she has gradually gotten worse over the past several months.  Patient states she has been having increased stress at 
school.  Patient states she feels like she is hopeless at times.  Patient states she has had thoughts of jumping off of high places and going down to railroad tracks.  Patient denies any visual or auditory hallucinations.  Patient denies any changes 
in her eating or sleeping habits.  Patient states she has not been compliant with her medications.

[facility]
Medical History (Reviewed 10/30/24 @ 13:37 by Dr. Peter Dalton DO)

Anxiety
Depression


Home Medications

?Medication ?Instructions ?Recorded ?Last Taken ?Type
bupropion HCl 300 mg 24 hr tablet, 300 mg PO DAILY 10/30/24 Unknown History
extended release    
fluoxetine 60 mg tablet 60 mg PO DAILY 10/30/24 Unknown History




Allergy/AdvReac Type Severity Reaction Status Date / Time
No Known Allergies Allergy   Verified 10/30/24 11:36


Social History (Reviewed 10/30/24 @ 13:37 by Dr. Peter Dalton DO)
Smoking Status:  Never smoker 



ROS
ROS ED
Constitutional
Constitutional ED: Denies chills or fever(s)
Eyes
Eyes: Denies blurry vision or change in vision
ENT
ENT ED: Denies rhinorrhea or sore throat
Cardiovascular
Cardiovascular: Denies chest pain or palpitations
Respiratory/Chest
Respiratory/Chest: Denies cough or dyspnea
Gastrointestinal
Gastrointestinal: Denies nausea or vomiting
Genitourinary
Genitourinary ED: Denies dysuria or hematuria
Musculoskeletal
Musculoskeletal: Denies back pain or neck pain
Integumentary
Denies abscess or rash
Neurologic
Neurologic: Denies headache(s) or weakness
Psychiatric
Psychiatric: Reports depression, suicidal ideation and suicidal thoughts
Allergic/Immunologic
Allergic/Immunologic ED: Denies mouth swelling or urticaria

EXAM
Physical Exam
Const
Vital Signs: 



 10/30/24
11:37 10/30/24
12:35 10/30/24
14:29
Temperature 98.1 F  97.8 F
Temperature Source Temporal  Oral
Pulse Rate 99 95 
Respiratory Rate 16 16 
Blood Pressure 117/76 110/71 94/68
Blood Pressure Mean 89 84 76
Pulse Ox 98 99 
Oxygen Delivery Method Room Air Room Air 

 10/30/24
15:00 10/30/24
16:00
Temperature  
Temperature Source  
Pulse Rate 91 88
Respiratory Rate 16 16
Blood Pressure 115/67 129/71 H
Blood Pressure Mean 83 90
Pulse Ox 99 98
Oxygen Delivery Method Room Air Room Air



Positive well nourished and well developed
General Appearance ED: well developed and NAD
HEENT
Reports moist mucous membranes
normocephalic and atraumatic
Neck
supple and no JVD
Resp
normal respiratory effort and clear to auscultation bilaterally
Cardio
Rate: regular rate
Rhythm: regular rhythm
GI
non-tender and non-distended
Palpation: soft
Neuro
oriented x3, CN's II-XII intact bilaterally and no sensory deficits noted
Radhames Coma Scale: document GCS findings Spontaneous Obeys Commands Oriented 15
Sensorium / Orientation: alert
Motor Exam: strength 5/5 throughout
Psych
mental status grossly normal and cooperative
Speech: soft
Mood & Affect: depressed and flat affect
Thought Content: suicidality, No homicidality, No phobia(s), No delusion(s) and No hallucination(s)

MDM
MDM
MDM Narrative
Medical decision making narrative: 
Medical screening labs will be obtained.  CBC will be obtained to assess for leukocytosis and anemia.  Basic metabolic profile will be obtained to assess for electrolyte abnormality and renal function.  Serum hCG will be obtained to assess for 
pregnancy.  Serum alcohol level will be obtained to assess for alcohol intoxication.  Urine drug screen will be obtained to assess for substance abuse.
Lab Data
Attestation: I reviewed the patient's lab results.
Lab results narrative: 
CBC was reviewed and was within normal limits.  Basic metabolic profile was reviewed and was within normal limits.  Serum hCG was reviewed and was negative.  Serum alcohol level was reviewed and was less than 3.0.  Urine tox cream was reviewed and 
was positive for opiates and MDMA.
Labs: 

Laboratory Results - last 24 hr

  10/30/24 10/30/24
  12:18 12:30
WBC  6.7 
RBC  4.22 
Hgb  12.9 
Hct  38.4 
MCV  91.0 
MCH  30.6 
MCHC  33.6 
RDW Std Deviation  40.1 
RDW Coeff of Kassy  12.0 
Plt Count  233 
MPV  11.1 
Immature Gran % (Auto)  0.300 
Neut % (Auto)  58.0 
Lymph % (Auto)  34.9 
Mono % (Auto)  4.9 
Eos % (Auto)  1.6 
Baso % (Auto)  0.3 
Absolute Neuts (auto)  3.9 
Absolute Lymphs (auto)  2.35 
Nucleated RBC %  0 
Sodium  139 
Potassium  3.8 
Chloride  108 H 
Carbon Dioxide  28.0 
Anion Gap  3 L 
BUN  12 
Creatinine  0.64 
Estim Creat Clear Calc  97.58 
Est GFR (MDRD) Af Amer  150 
Est GFR (MDRD) Non-Af  124 
BUN/Creatinine Ratio  18.6 
Glucose  109 H 
Calcium  9.1 
Serum Pregnancy, Qual  NEGATIVE 
Urine Opiates Screen   POSITIVE H
Urine Methadone Screen   NEGATIVE
Ur Barbiturates Screen   NEGATIVE
Ur Phencyclidine Scrn   NEGATIVE
Ur Amphetamines Screen   NEGATIVE
MDMA (Ecstasy) Screen   POSITIVE H
U Benzodiazepines Scrn   NEGATIVE
Urine Cocaine Screen   NEGATIVE
U Cannabinoids Screen   NEGATIVE
Ur Drug Screen Comment   **
Ethyl Alcohol  < 3.0 



Treatment and Re-Evaluation
Narrative: 
Suicide precautions were maintained.  Patient is medically cleared for admission to psychiatric facility.  Crisis counselor was in to evaluate the patient would need to be admitted to psychiatric facility.  Patient understands.  Crisis counselor 
will attempt to make arrangements for placement.  Patient understands and is agreeable with plan.  Pink slip filled out and placed on the chart.

Discharge Plan
Triage
Chief Complaint: Suicidal

ED Provider: Peter Dalton

Dx/Rx/DC Orders
Clinical Impression:
 Depression, Suicidal ideation


Prescriptions:
No Action
  bupropion HCl 300 mg tablet extended release 24 hr 
   300 mg PO DAILY 
  fluoxetine 60 mg tablet 
   60 mg PO DAILY 

Primary Care Provider: Care Physician,No Primary

Referrals:
Care Physician,No Primary [Primary Care Provider] - 

Print Language: English

Disposition
***Disposition***: Psychiatric Hospital or Unit

## 2024-10-30 NOTE — EDS_ITS
HPI    
HPI - Psych    
History of Present Illness    
Chief Complaint: Suicidal    
Informant: patient    
Onset/Context/Timing    
Onset: Days    
Context: Gradual Onset    
Timing: Continuous    
Worsened by: - (Increased stress at school)    
Relieved by: Nothing    
Associated Symptoms    
Associated Symptoms - Psych: Positive for Depressed, Hopelessness and Suicidal   
Thoughts; Negative for Change in Eating, Change in sleeping, Paranoia, Visual   
Hallucinations or Auditory Hallucinations    
Specific plan (suicidal thought): Jumping off high places, going to railroad   
tracks    
Narrative    
Narrative:     
Patient presents with depression and suicidal ideation that has been getting   
worse over the past couple days.  Patient states she has gradually gotten worse   
over the past several months.  Patient states she has been having increased   
stress at school.  Patient states she feels like she is hopeless at times.    
Patient states she has had thoughts of jumping off of high places and going down  
to railroad tracks.  Patient denies any visual or auditory hallucinations.  Pat  
theresa denies any changes in her eating or sleeping habits.  Patient states she   
has not been compliant with her medications.    
    
[facility]    
Medical History (Reviewed 10/30/24 @ 13:37 by Dr. Peter Dalton DO)    
    
Anxiety    
Depression    
    
    
                                Home Medications    
    
    
    
?Medication ?Instructions ?Recorded ?Last Taken ?Type    
     
bupropion HCl 300 mg 24 hr tablet, 300 mg PO DAILY 10/30/24 Unknown History    
    
extended release        
     
fluoxetine 60 mg tablet 60 mg PO DAILY 10/30/24 Unknown History    
    
    
    
                                            
    
    
    
Allergy/AdvReac Type Severity Reaction Status Date / Time    
     
No Known Allergies Allergy   Verified 10/30/24 11:36    
    
    
    
Social History (Reviewed 10/30/24 @ 13:37 by Dr. Peter Dalton DO)    
Smoking Status:  Never smoker     
    
    
    
ROS    
ROS ED    
Constitutional    
Constitutional ED: Denies chills or fever(s)    
Eyes    
Eyes: Denies blurry vision or change in vision    
ENT    
ENT ED: Denies rhinorrhea or sore throat    
Cardiovascular    
Cardiovascular: Denies chest pain or palpitations    
Respiratory/Chest    
Respiratory/Chest: Denies cough or dyspnea    
Gastrointestinal    
Gastrointestinal: Denies nausea or vomiting    
Genitourinary    
Genitourinary ED: Denies dysuria or hematuria    
Musculoskeletal    
Musculoskeletal: Denies back pain or neck pain    
Integumentary    
Denies abscess or rash    
Neurologic    
Neurologic: Denies headache(s) or weakness    
Psychiatric    
Psychiatric: Reports depression, suicidal ideation and suicidal thoughts    
Allergic/Immunologic    
Allergic/Immunologic ED: Denies mouth swelling or urticaria    
    
EXAM    
Physical Exam    
Const    
Vital Signs:     
    
                                            
    
    
    
 10/30/24    
11:37 10/30/24    
12:35 10/30/24    
14:29    
     
Temperature 98.1 F  97.8 F    
     
Temperature Source Temporal  Oral    
     
Pulse Rate 99 95     
     
Respiratory Rate 16 16     
     
Blood Pressure 117/76 110/71 94/68    
     
Blood Pressure Mean 89 84 76    
     
Pulse Ox 98 99     
     
Oxygen Delivery Method Room Air Room Air     
    
    
    
    
 10/30/24    
15:00 10/30/24    
16:00    
     
Temperature      
     
Temperature Source      
     
Pulse Rate 91 88    
     
Respiratory Rate 16 16    
     
Blood Pressure 115/67 129/71 H    
     
Blood Pressure Mean 83 90    
     
Pulse Ox 99 98    
     
Oxygen Delivery Method Room Air Room Air    
    
    
    
    
Positive well nourished and well developed    
General Appearance ED: well developed and NAD    
HEENT    
Reports moist mucous membranes    
normocephalic and atraumatic    
Neck    
supple and no JVD    
Resp    
normal respiratory effort and clear to auscultation bilaterally    
Cardio    
Rate: regular rate    
Rhythm: regular rhythm    
GI    
non-tender and non-distended    
Palpation: soft    
Neuro    
oriented x3, CN's II-XII intact bilaterally and no sensory deficits noted    
Friant Coma Scale: document GCS findings Spontaneous Obeys Commands Oriented 15    
Sensorium / Orientation: alert    
Motor Exam: strength 5/5 throughout    
Psych    
mental status grossly normal and cooperative    
Speech: soft    
Mood & Affect: depressed and flat affect    
Thought Content: suicidality, No homicidality, No phobia(s), No delusion(s) and   
No hallucination(s)    
    
MDM    
MDM    
MDM Narrative    
Medical decision making narrative:     
Medical screening labs will be obtained.  CBC will be obtained to assess for   
leukocytosis and anemia.  Basic metabolic profile will be obtained to assess for  
electrolyte abnormality and renal function.  Serum hCG will be obtained to   
assess for pregnancy.  Serum alcohol level will be obtained to assess for   
alcohol intoxication.  Urine drug screen will be obtained to assess for   
substance abuse.    
Lab Data    
Attestation: I reviewed the patient's lab results.    
Lab results narrative:     
CBC was reviewed and was within normal limits.  Basic metabolic profile was   
reviewed and was within normal limits.  Serum hCG was reviewed and was negative.  
 Serum alcohol level was reviewed and was less than 3.0.  Urine tox cream was   
reviewed and was positive for opiates and MDMA.    
Labs:     
    
                         Laboratory Results - last 24 hr    
    
    
    
  10/30/24 10/30/24    
    
  12:18 12:30    
     
WBC  6.7     
     
RBC  4.22     
     
Hgb  12.9     
     
Hct  38.4     
     
MCV  91.0     
     
MCH  30.6     
     
MCHC  33.6     
     
RDW Std Deviation  40.1     
     
RDW Coeff of Kassy  12.0     
     
Plt Count  233     
     
MPV  11.1     
     
Immature Gran % (Auto)  0.300     
     
Neut % (Auto)  58.0     
     
Lymph % (Auto)  34.9     
     
Mono % (Auto)  4.9     
     
Eos % (Auto)  1.6     
     
Baso % (Auto)  0.3     
     
Absolute Neuts (auto)  3.9     
     
Absolute Lymphs (auto)  2.35     
     
Nucleated RBC %  0     
     
Sodium  139     
     
Potassium  3.8     
     
Chloride  108 H     
     
Carbon Dioxide  28.0     
     
Anion Gap  3 L     
     
BUN  12     
     
Creatinine  0.64     
     
Estim Creat Clear Calc  97.58     
     
Est GFR (MDRD) Af Amer  150     
     
Est GFR (MDRD) Non-Af  124     
     
BUN/Creatinine Ratio  18.6     
     
Glucose  109 H     
     
Calcium  9.1     
     
Serum Pregnancy, Qual  NEGATIVE     
     
Urine Opiates Screen   POSITIVE H    
     
Urine Methadone Screen   NEGATIVE    
     
Ur Barbiturates Screen   NEGATIVE    
     
Ur Phencyclidine Scrn   NEGATIVE    
     
Ur Amphetamines Screen   NEGATIVE    
     
MDMA (Ecstasy) Screen   POSITIVE H    
     
U Benzodiazepines Scrn   NEGATIVE    
     
Urine Cocaine Screen   NEGATIVE    
     
U Cannabinoids Screen   NEGATIVE    
     
Ur Drug Screen Comment   **    
     
Ethyl Alcohol  < 3.0     
    
    
    
    
Treatment and Re-Evaluation    
Narrative:     
Suicide precautions were maintained.  Patient is medically cleared for admission  
to psychiatric facility.  Crisis counselor was in to evaluate the patient would   
need to be admitted to psychiatric facility.  Patient understands.  Crisis cou  
nselor will attempt to make arrangements for placement.  Patient understands and  
is agreeable with plan.  Pink slip filled out and placed on the chart.    
    
Discharge Plan    
Triage    
Chief Complaint: Suicidal    
    
ED Provider: Peter Dalton    
    
Dx/Rx/DC Orders    
Clinical Impression:    
 Depression, Suicidal ideation    
    
    
Prescriptions:    
No Action    
  bupropion HCl 300 mg tablet extended release 24 hr     
   300 mg PO DAILY     
  fluoxetine 60 mg tablet     
   60 mg PO DAILY     
    
Primary Care Provider: Care Physician,No Primary    
    
Referrals:    
Care Physician,No Primary [Primary Care Provider] -     
    
Print Language: English    
    
Disposition    
***Disposition***: Psychiatric Hospital or Unit

## 2024-11-15 ENCOUNTER — HOSPITAL ENCOUNTER (EMERGENCY)
Dept: HOSPITAL 100 - ED | Age: 19
LOS: 1 days | Discharge: HOME | End: 2024-11-16
Payer: COMMERCIAL

## 2024-11-15 VITALS
OXYGEN SATURATION: 100 % | SYSTOLIC BLOOD PRESSURE: 113 MMHG | RESPIRATION RATE: 18 BRPM | DIASTOLIC BLOOD PRESSURE: 65 MMHG | HEART RATE: 116 BPM

## 2024-11-15 VITALS
DIASTOLIC BLOOD PRESSURE: 77 MMHG | SYSTOLIC BLOOD PRESSURE: 122 MMHG | HEART RATE: 106 BPM | OXYGEN SATURATION: 97 % | RESPIRATION RATE: 16 BRPM

## 2024-11-15 VITALS
HEART RATE: 101 BPM | RESPIRATION RATE: 16 BRPM | OXYGEN SATURATION: 97 % | DIASTOLIC BLOOD PRESSURE: 62 MMHG | SYSTOLIC BLOOD PRESSURE: 114 MMHG

## 2024-11-15 VITALS
RESPIRATION RATE: 16 BRPM | SYSTOLIC BLOOD PRESSURE: 135 MMHG | TEMPERATURE: 97.8 F | OXYGEN SATURATION: 95 % | HEART RATE: 122 BPM | DIASTOLIC BLOOD PRESSURE: 86 MMHG

## 2024-11-15 VITALS — BODY MASS INDEX: 18.5 KG/M2

## 2024-11-15 DIAGNOSIS — F32.A: ICD-10-CM

## 2024-11-15 DIAGNOSIS — F12.90: Primary | ICD-10-CM

## 2024-11-15 DIAGNOSIS — Z79.899: ICD-10-CM

## 2024-11-15 DIAGNOSIS — R11.2: ICD-10-CM

## 2024-11-15 DIAGNOSIS — F41.9: ICD-10-CM

## 2024-11-15 PROCEDURE — 96374 THER/PROPH/DIAG INJ IV PUSH: CPT

## 2024-11-15 PROCEDURE — 99285 EMERGENCY DEPT VISIT HI MDM: CPT

## 2024-11-15 PROCEDURE — 96375 TX/PRO/DX INJ NEW DRUG ADDON: CPT

## 2024-11-15 PROCEDURE — 96376 TX/PRO/DX INJ SAME DRUG ADON: CPT

## 2024-11-15 PROCEDURE — A4216 STERILE WATER/SALINE, 10 ML: HCPCS

## 2024-11-16 VITALS
DIASTOLIC BLOOD PRESSURE: 51 MMHG | SYSTOLIC BLOOD PRESSURE: 107 MMHG | RESPIRATION RATE: 16 BRPM | HEART RATE: 107 BPM | OXYGEN SATURATION: 97 %

## 2024-11-16 VITALS
DIASTOLIC BLOOD PRESSURE: 65 MMHG | OXYGEN SATURATION: 97 % | RESPIRATION RATE: 16 BRPM | HEART RATE: 103 BPM | SYSTOLIC BLOOD PRESSURE: 116 MMHG

## 2024-11-16 VITALS
OXYGEN SATURATION: 100 % | DIASTOLIC BLOOD PRESSURE: 83 MMHG | SYSTOLIC BLOOD PRESSURE: 117 MMHG | RESPIRATION RATE: 16 BRPM | HEART RATE: 101 BPM

## 2024-11-16 VITALS
HEART RATE: 101 BPM | OXYGEN SATURATION: 100 % | SYSTOLIC BLOOD PRESSURE: 117 MMHG | TEMPERATURE: 97.8 F | RESPIRATION RATE: 16 BRPM | DIASTOLIC BLOOD PRESSURE: 83 MMHG

## 2024-11-16 VITALS
OXYGEN SATURATION: 96 % | DIASTOLIC BLOOD PRESSURE: 74 MMHG | SYSTOLIC BLOOD PRESSURE: 114 MMHG | HEART RATE: 118 BPM | RESPIRATION RATE: 15 BRPM

## 2024-12-17 ENCOUNTER — APPOINTMENT (OUTPATIENT)
Dept: BEHAVIORAL HEALTH | Facility: CLINIC | Age: 19
End: 2024-12-17
Payer: COMMERCIAL

## 2024-12-17 DIAGNOSIS — F41.1 GAD (GENERALIZED ANXIETY DISORDER): ICD-10-CM

## 2024-12-17 DIAGNOSIS — F32.1 MAJOR DEPRESSIVE DISORDER, SINGLE EPISODE, MODERATE (MULTI): ICD-10-CM

## 2024-12-17 PROCEDURE — 99213 OFFICE O/P EST LOW 20 MIN: CPT | Performed by: STUDENT IN AN ORGANIZED HEALTH CARE EDUCATION/TRAINING PROGRAM

## 2024-12-17 NOTE — PROGRESS NOTES
Outpatient Child and Adolescent Psychiatry    Kera Pelaez, a 19 y.o. female, is seen for psychiatric medication management follow up.  Virtual or Telephone Consent    An interactive audio and video telecommunication system which permits real time communications between the patient (at the originating site) and provider (at the distant site) was utilized to provide this telehealth service.   Verbal consent was requested and obtained from Kera Pelaez on this date, 12/17/24 for a telehealth visit.     Last seen 10/30/2024 Had stopped all medication. Hospitalized  Interim changes: None  Chart review: Reviewed  Last vitals: 10/8/2024  Last labs: 3/2021 Vitamin D 21    Chief Complaint:  depression    Subjective   Mom - [phone]  School - [employer] -  Professor Efrain Landin  202    Reports psych unit was not good. Upset about hospitalization. Got back on meds. Not much therapy in hospital. Met with psychiatrist only once. Mood a little better now. Suicidal thoughts better by time left hospital. 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 Major continues to be English. Continues to struggle with taking meds. At home for past week. Difficult to have a schedule. Seeing therapist. Has wondered if executive function going on. Can send her carol ann. Lots of starting things and not finishing. Gets bored, restless. Has tried lists, planners, modifying schedule, etc.. Roommate recently diagnosed with ADHD.    No suicidal ideation.      Mental Status Exam:   General appearance: Appropriate grooming and hygiene  Engagement: Well related  Psychomotor activity: Within normal limits  Speech and Language: Normal  Mood: Euthymic  Affect: Full range  Though process: Linear  Perceptual disturbances: None  Attention: Normal  Judgement and insight: commensurate with development  Suicidality and homicidality: No current suicidal ideation, plan or intent. No evidence of homicidal ideation, plan or  "intent    Current Medications:    Current Outpatient Medications:     buPROPion XL (Wellbutrin XL) 300 mg 24 hr tablet, Take 1 tablet (300 mg) by mouth once daily. as directed, Disp: 90 tablet, Rfl: 3    FLUoxetine (PROzac) 60 mg tablet, Take 1 tablet (60 mg) by mouth once daily., Disp: 90 tablet, Rfl: 3      Assessment/Plan   12/17/24: Patient is a 19 y.o. female who is seen for follow up.    Diagnosis:   1. CHIDI (generalized anxiety disorder)        2. Major depressive disorder, single episode, moderate (Multi)            Hx of symptoms of depression since March 2020 (although better during summer 2020) including daily low mood, loss of interest/anhedonia, lack of motivation/declining grades, disrupted sleep, fatigue, guilt, low appetite, hx of SI. Struggles with some anxiety at times. Hx IOP at [facility] Dec 2021. Hx IP hospitalization in [place] 11/2024.     At this visit some improvement in depression. Discussed possibility of ADHD diagnosis.     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  - Continue fluoxetine (prozac) 60mg daily for depression.   - Continue Wellbutrin XL 300mg for depression.   - [place] form sent for Kera to fill out and return  - Recommended \"Wherever you go There you Are\" and learning about mindfullness at previous visit  - follow up Jan 9th at 9am  - Referred to Dr. Janet Kaur at [facility] - link provided and encouraged Kera to schedule with her to transition to adult psychiatry    Martita Bailey MD   "

## 2025-01-09 ENCOUNTER — APPOINTMENT (OUTPATIENT)
Dept: BEHAVIORAL HEALTH | Facility: CLINIC | Age: 20
End: 2025-01-09
Payer: COMMERCIAL

## 2025-01-09 DIAGNOSIS — F32.1 MAJOR DEPRESSIVE DISORDER, SINGLE EPISODE, MODERATE (MULTI): ICD-10-CM

## 2025-01-09 DIAGNOSIS — F41.1 GAD (GENERALIZED ANXIETY DISORDER): ICD-10-CM

## 2025-01-09 PROCEDURE — 99213 OFFICE O/P EST LOW 20 MIN: CPT | Performed by: STUDENT IN AN ORGANIZED HEALTH CARE EDUCATION/TRAINING PROGRAM

## 2025-01-09 RX ORDER — BUPROPION HYDROCHLORIDE 300 MG/1
300 TABLET ORAL DAILY
Qty: 90 TABLET | Refills: 3 | Status: SHIPPED | OUTPATIENT
Start: 2025-01-09

## 2025-01-09 NOTE — PROGRESS NOTES
Outpatient Child and Adolescent Psychiatry    Kera Pelaez, a 19 y.o. female, is seen for psychiatric medication management follow up.  Virtual or Telephone Consent    An interactive audio and video telecommunication system which permits real time communications between the patient (at the originating site) and provider (at the distant site) was utilized to provide this telehealth service.   Verbal consent was requested and obtained from Kera Pelaez on this date, 01/09/25 for a telehealth visit.     Last seen 12/17/2024 No med changes  10/30/2024 Had stopped all medication. Hospitalized  Interim changes:  Reviewed [facility] patient returned 5/9 inattentive  Chart review: Reviewed  Last vitals: 10/8/2024  Last labs: 3/2021 Vitamin D 21    Chief Complaint:  depression    Subjective   Mom - [phone]  School - [employer] -  Professor Efrain Landin  202    Kera notes things are going ok. Ready to go back to school soon. A little worry going back. Supports: friends, therapist. Saw therapist once and then didn't schedule anymore. Appointment scheduled Wednesday in person. Depression manageable. Not too many stressors. Denies any suicidal ideation. Taking medication consistently. Does feel they help. Talked about getting further testing for ADHD.     Mental Status Exam:   General appearance: Appropriate grooming and hygiene  Engagement: Well related  Psychomotor activity: Within normal limits  Speech and Language: Normal  Mood: Euthymic  Affect: Full range  Though process: Linear  Perceptual disturbances: None  Attention: Normal  Judgement and insight: commensurate with development  Suicidality and homicidality: No current suicidal ideation, plan or intent. No evidence of homicidal ideation, plan or intent    Current Medications:    Current Outpatient Medications:     buPROPion XL (Wellbutrin XL) 300 mg 24 hr tablet, Take 1 tablet (300 mg) by mouth once daily. as directed, Disp: 90 tablet,  Rfl: 3    FLUoxetine (PROzac) 60 mg tablet, Take 1 tablet (60 mg) by mouth once daily., Disp: 90 tablet, Rfl: 3      Assessment/Plan   01/09/25: Patient is a 19 y.o. female who is seen for follow up.    Diagnosis:   1. CHIDI (generalized anxiety disorder)        2. Major depressive disorder, single episode, moderate (Multi)            Hx of symptoms of depression since March 2020 (although better during summer 2020) including daily low mood, loss of interest/anhedonia, lack of motivation/declining grades, disrupted sleep, fatigue, guilt, low appetite, hx of SI. Struggles with some anxiety at times. [place] [facility] [place]/2024.     At this visit depression stable. Discussed possibility of ADHD diagnosis.     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  - Continue fluoxetine (prozac) 60mg daily for depression.   - Continue Wellbutrin XL 300mg for depression.   - follow up Feb 10th at 9am  - Referred to Dr. Janet Kaur at [facility] - link provided and encouraged Kera to schedule with her to transition to adult psychiatry    Martita Bailey MD

## 2025-02-10 ENCOUNTER — APPOINTMENT (OUTPATIENT)
Dept: BEHAVIORAL HEALTH | Facility: CLINIC | Age: 20
End: 2025-02-10
Payer: COMMERCIAL

## 2025-02-21 ENCOUNTER — TELEPHONE (OUTPATIENT)
Dept: OTHER | Age: 20
End: 2025-02-21
Payer: COMMERCIAL

## 2025-02-21 NOTE — TELEPHONE ENCOUNTER
called pt, no answer, left vm requesting a return call to get pt rescheduled, also sent my chart message requesting the same.///ml

## 2025-06-26 ENCOUNTER — APPOINTMENT (OUTPATIENT)
Dept: OPHTHALMOLOGY | Facility: CLINIC | Age: 20
End: 2025-06-26
Payer: COMMERCIAL

## 2025-10-06 ENCOUNTER — APPOINTMENT (OUTPATIENT)
Dept: OPHTHALMOLOGY | Facility: CLINIC | Age: 20
End: 2025-10-06
Payer: COMMERCIAL